# Patient Record
Sex: FEMALE | Race: WHITE | Employment: UNEMPLOYED | URBAN - METROPOLITAN AREA
[De-identification: names, ages, dates, MRNs, and addresses within clinical notes are randomized per-mention and may not be internally consistent; named-entity substitution may affect disease eponyms.]

---

## 2018-11-14 ENCOUNTER — HOSPITAL ENCOUNTER (OUTPATIENT)
Age: 23
Setting detail: OBSERVATION
Discharge: HOME | End: 2018-11-14
Attending: OBSTETRICS & GYNECOLOGY | Admitting: OBSTETRICS & GYNECOLOGY
Payer: COMMERCIAL

## 2018-11-14 VITALS
RESPIRATION RATE: 16 BRPM | SYSTOLIC BLOOD PRESSURE: 118 MMHG | HEART RATE: 96 BPM | DIASTOLIC BLOOD PRESSURE: 80 MMHG | TEMPERATURE: 98 F | HEIGHT: 62 IN | WEIGHT: 193 LBS | BODY MASS INDEX: 35.51 KG/M2

## 2018-11-14 PROBLEM — O36.8390 FETAL ARRHYTHMIA AFFECTING PREGNANCY, ANTEPARTUM: Status: ACTIVE | Noted: 2018-11-14

## 2018-11-14 PROBLEM — O36.8190 DECREASED FETAL MOVEMENT AFFECTING MANAGEMENT OF MOTHER, ANTEPARTUM: Status: ACTIVE | Noted: 2018-11-14

## 2018-11-14 PROBLEM — R94.8 ABNORMAL PLACENTA FUNCTION TEST: Status: ACTIVE | Noted: 2018-11-14

## 2018-11-14 LAB
AMPHETAMINE SCREEN, URINE: NOT DETECTED
BARBITURATE SCREEN URINE: NOT DETECTED
BENZODIAZEPINE SCREEN, URINE: NOT DETECTED
CANNABINOID SCREEN URINE: NOT DETECTED
COCAINE METABOLITE SCREEN URINE: NOT DETECTED
METHADONE SCREEN, URINE: NOT DETECTED
OPIATE SCREEN URINE: NOT DETECTED
PHENCYCLIDINE SCREEN URINE: NOT DETECTED
PROPOXYPHENE SCREEN: NOT DETECTED

## 2018-11-14 PROCEDURE — 76820 UMBILICAL ARTERY ECHO: CPT

## 2018-11-14 PROCEDURE — 76805 OB US >/= 14 WKS SNGL FETUS: CPT

## 2018-11-14 PROCEDURE — 99243 OFF/OP CNSLTJ NEW/EST LOW 30: CPT | Performed by: OBSTETRICS & GYNECOLOGY

## 2018-11-14 PROCEDURE — 76819 FETAL BIOPHYS PROFIL W/O NST: CPT | Performed by: OBSTETRICS & GYNECOLOGY

## 2018-11-14 PROCEDURE — 80307 DRUG TEST PRSMV CHEM ANLYZR: CPT

## 2018-11-14 PROCEDURE — 76805 OB US >/= 14 WKS SNGL FETUS: CPT | Performed by: OBSTETRICS & GYNECOLOGY

## 2018-11-14 PROCEDURE — G0378 HOSPITAL OBSERVATION PER HR: HCPCS

## 2018-11-14 PROCEDURE — 59025 FETAL NON-STRESS TEST: CPT

## 2018-11-14 PROCEDURE — 76820 UMBILICAL ARTERY ECHO: CPT | Performed by: OBSTETRICS & GYNECOLOGY

## 2018-11-14 PROCEDURE — 76819 FETAL BIOPHYS PROFIL W/O NST: CPT

## 2018-11-14 NOTE — PROGRESS NOTES
Comes from home. Was seen earlier today in office where she complained of decreased fetal movement. [facility] first noted decreased movement at 2000 yesterday.  [facility] told to go home and eat and do kick counts and then call doctor back. Still had decreased movements and came for monitoring. Arrhythmia heard in fetal heart rate. Denies complications in prekgnancy.   Is rh negative and had rhogam.

## 2018-11-15 NOTE — CONSULTS
rate is 147 bpm. The fetus is in the cephalic presentation which was confirmed by the ultrasound assessment. EXTREMITIES:  No peripheral edema is noted. A fetal ultrasound assessment was performed today. A report is enclosed for your review. Admission on 2018, Discharged on 2018   Component Date Value Ref Range Status    Amphetamine Screen, Urine 2018 NOT DETECTED  Negative <1000 ng/mL Final    Barbiturate Screen, Ur 2018 NOT DETECTED  Negative < 200 ng/mL Final    Benzodiazepine Screen, Urine 2018 NOT DETECTED  Negative < 200 ng/mL Final    Cannabinoid Scrn, Ur 2018 NOT DETECTED  Negative < 50ng/mL Final    Cocaine Metabolite Screen, Urine 2018 NOT DETECTED  Negative < 300 ng/mL Final    Opiate Scrn, Ur 2018 NOT DETECTED  Negative < 300ng/mL Final    PCP Screen, Urine 2018 NOT DETECTED  Negative < 25 ng/mL Final    Methadone Screen, Urine 2018 NOT DETECTED  Negative <300 ng/mL Final    Propoxyphene Scrn, Ur 2018 NOT DETECTED  Negative <300 ng/mL Final       IMPRESSION:  1.  IUP at 32 weeks 0 days gestation based on her Estimated Date of Delivery: 19  2. Fetal arrhythmia  3. Fetal PACs  4. Decreased fetal movement on admission, but within normal limits now  5. Reassuring fetal heart rate tracing  6.  Reassuring biophysical profile and cord Doppler testing    PLAN:  I would recommend that the patient count fetal movements and call if she notices any subjective decrease in fetal movements, particularly if there are less than 10 major movements in an hour. Non-stress testing should be performed every 3 to 4 days through the balance of the pregnancy. Serial ultrasounds to assess fetal anatomy and growth should be performed. The patient is at increase risk for  morbidity and mortality secondary to her history.  Weekly BPP and cord Doppler testing should be performed, unless there is a clinical indication to perform the

## 2018-11-15 NOTE — PROGRESS NOTES
Spoke with Dr Mariel Sinclair. Notified that [facility] states patient can be discharged with NST rtwice weekly and BPP weekly. Orders given for discharge.  Patient to be seen in office on Monday and NST on unit on friday

## 2018-11-15 NOTE — PROGRESS NOTES
Discharge instructions given to patient. Patient states that she verbally understands instructions. Patient educated on  labor signs and when to return to unit. Patient has no questions at this time.  Patient ambulated off floor alone

## 2018-11-19 ENCOUNTER — HOSPITAL ENCOUNTER (OUTPATIENT)
Age: 23
Discharge: HOME | End: 2018-11-19
Attending: OBSTETRICS & GYNECOLOGY | Admitting: OBSTETRICS & GYNECOLOGY
Payer: COMMERCIAL

## 2018-11-19 VITALS
RESPIRATION RATE: 16 BRPM | HEART RATE: 83 BPM | HEIGHT: 62 IN | WEIGHT: 195 LBS | DIASTOLIC BLOOD PRESSURE: 56 MMHG | TEMPERATURE: 99 F | SYSTOLIC BLOOD PRESSURE: 93 MMHG | BODY MASS INDEX: 35.88 KG/M2

## 2018-11-19 PROBLEM — Z3A.32 32 WEEKS GESTATION OF PREGNANCY: Status: ACTIVE | Noted: 2018-11-19

## 2018-11-19 LAB
HCT VFR BLD CALC: 39.6 % (ref 34–48)
HEMOGLOBIN: 13.2 G/DL (ref 11.5–15.5)
KLEIHAUER BETKE: NORMAL
MCH RBC QN AUTO: 32 PG (ref 26–35)
MCHC RBC AUTO-ENTMCNC: 33.3 % (ref 32–34.5)
MCV RBC AUTO: 95.9 FL (ref 80–99.9)
PDW BLD-RTO: 13.6 FL (ref 11.5–15)
PLATELET # BLD: 221 E9/L (ref 130–450)
PMV BLD AUTO: 9 FL (ref 7–12)
RBC # BLD: 4.13 E12/L (ref 3.5–5.5)
WBC # BLD: 12.3 E9/L (ref 4.5–11.5)

## 2018-11-19 PROCEDURE — 85027 COMPLETE CBC AUTOMATED: CPT

## 2018-11-19 PROCEDURE — 85460 HEMOGLOBIN FETAL: CPT

## 2018-11-19 PROCEDURE — 36415 COLL VENOUS BLD VENIPUNCTURE: CPT

## 2018-11-19 PROCEDURE — G0379 DIRECT REFER HOSPITAL OBSERV: HCPCS

## 2018-11-19 PROCEDURE — G0378 HOSPITAL OBSERVATION PER HR: HCPCS

## 2018-11-19 PROCEDURE — 99213 OFFICE O/P EST LOW 20 MIN: CPT | Performed by: OBSTETRICS & GYNECOLOGY

## 2018-11-23 ENCOUNTER — HOSPITAL ENCOUNTER (OUTPATIENT)
Age: 23
Discharge: HOME | End: 2018-11-23
Attending: OBSTETRICS & GYNECOLOGY | Admitting: OBSTETRICS & GYNECOLOGY
Payer: COMMERCIAL

## 2018-11-23 ENCOUNTER — APPOINTMENT (OUTPATIENT)
Dept: LABOR AND DELIVERY | Age: 23
End: 2018-11-23
Payer: COMMERCIAL

## 2018-11-23 VITALS
WEIGHT: 195 LBS | TEMPERATURE: 97.8 F | SYSTOLIC BLOOD PRESSURE: 112 MMHG | BODY MASS INDEX: 35.88 KG/M2 | HEIGHT: 62 IN | HEART RATE: 77 BPM | RESPIRATION RATE: 16 BRPM | DIASTOLIC BLOOD PRESSURE: 74 MMHG

## 2018-11-23 PROBLEM — Z3A.33 33 WEEKS GESTATION OF PREGNANCY: Status: ACTIVE | Noted: 2018-11-23

## 2018-11-23 PROCEDURE — 59025 FETAL NON-STRESS TEST: CPT
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
## 2019-01-02 ENCOUNTER — ANESTHESIA EVENT (OUTPATIENT)
Dept: LABOR AND DELIVERY | Age: 24
DRG: 560 | End: 2019-01-02
Payer: COMMERCIAL

## 2019-01-02 ENCOUNTER — ANESTHESIA (OUTPATIENT)
Dept: LABOR AND DELIVERY | Age: 24
DRG: 560 | End: 2019-01-02
Payer: COMMERCIAL

## 2019-01-02 ENCOUNTER — HOSPITAL ENCOUNTER (INPATIENT)
Age: 24
LOS: 3 days | Discharge: HOME | DRG: 560 | End: 2019-01-05
Attending: OBSTETRICS & GYNECOLOGY | Admitting: OBSTETRICS & GYNECOLOGY
Payer: COMMERCIAL

## 2019-01-02 PROBLEM — O36.5990 IUGR, ANTENATAL: Status: ACTIVE | Noted: 2019-01-02

## 2019-01-02 LAB
HCT VFR BLD CALC: 36.7 % (ref 34–48)
HEMOGLOBIN: 12.9 G/DL (ref 11.5–15.5)
MCH RBC QN AUTO: 33.2 PG (ref 26–35)
MCHC RBC AUTO-ENTMCNC: 35.1 % (ref 32–34.5)
MCV RBC AUTO: 94.3 FL (ref 80–99.9)
PDW BLD-RTO: 13.2 FL (ref 11.5–15)
PLATELET # BLD: 244 E9/L (ref 130–450)
PMV BLD AUTO: 9.3 FL (ref 7–12)
RBC # BLD: 3.89 E12/L (ref 3.5–5.5)
WBC # BLD: 12.4 E9/L (ref 4.5–11.5)

## 2019-01-02 PROCEDURE — 86880 COOMBS TEST DIRECT: CPT

## 2019-01-02 PROCEDURE — 2580000003 HC RX 258: Performed by: OBSTETRICS & GYNECOLOGY

## 2019-01-02 PROCEDURE — 86850 RBC ANTIBODY SCREEN: CPT

## 2019-01-02 PROCEDURE — 86870 RBC ANTIBODY IDENTIFICATION: CPT

## 2019-01-02 PROCEDURE — 6360000002 HC RX W HCPCS: Performed by: OBSTETRICS & GYNECOLOGY

## 2019-01-02 PROCEDURE — 86901 BLOOD TYPING SEROLOGIC RH(D): CPT

## 2019-01-02 PROCEDURE — 86900 BLOOD TYPING SEROLOGIC ABO: CPT

## 2019-01-02 PROCEDURE — 85027 COMPLETE CBC AUTOMATED: CPT

## 2019-01-02 PROCEDURE — 1220000001 HC SEMI PRIVATE L&D R&B

## 2019-01-02 PROCEDURE — 36415 COLL VENOUS BLD VENIPUNCTURE: CPT

## 2019-01-02 PROCEDURE — 3E033VJ INTRODUCTION OF OTHER HORMONE INTO PERIPHERAL VEIN, PERCUTANEOUS APPROACH: ICD-10-PCS | Performed by: OBSTETRICS & GYNECOLOGY

## 2019-01-02 RX ORDER — DIPHENHYDRAMINE HCL 25 MG
25 TABLET ORAL EVERY 4 HOURS PRN
Status: DISCONTINUED | OUTPATIENT
Start: 2019-01-02 | End: 2019-01-03 | Stop reason: HOSPADM

## 2019-01-02 RX ORDER — ACETAMINOPHEN 325 MG/1
650 TABLET ORAL EVERY 4 HOURS PRN
Status: DISCONTINUED | OUTPATIENT
Start: 2019-01-02 | End: 2019-01-03 | Stop reason: HOSPADM

## 2019-01-02 RX ORDER — SODIUM CHLORIDE 0.9 % (FLUSH) 0.9 %
10 SYRINGE (ML) INJECTION EVERY 12 HOURS SCHEDULED
Status: DISCONTINUED | OUTPATIENT
Start: 2019-01-02 | End: 2019-01-03 | Stop reason: HOSPADM

## 2019-01-02 RX ORDER — ONDANSETRON 2 MG/ML
4 INJECTION INTRAMUSCULAR; INTRAVENOUS EVERY 6 HOURS PRN
Status: DISCONTINUED | OUTPATIENT
Start: 2019-01-02 | End: 2019-01-03 | Stop reason: HOSPADM

## 2019-01-02 RX ORDER — SODIUM CHLORIDE 0.9 % (FLUSH) 0.9 %
10 SYRINGE (ML) INJECTION PRN
Status: DISCONTINUED | OUTPATIENT
Start: 2019-01-02 | End: 2019-01-03 | Stop reason: HOSPADM

## 2019-01-02 RX ORDER — SODIUM CHLORIDE, SODIUM LACTATE, POTASSIUM CHLORIDE, CALCIUM CHLORIDE 600; 310; 30; 20 MG/100ML; MG/100ML; MG/100ML; MG/100ML
INJECTION, SOLUTION INTRAVENOUS CONTINUOUS
Status: DISCONTINUED | OUTPATIENT
Start: 2019-01-02 | End: 2019-01-03 | Stop reason: SDUPTHER

## 2019-01-02 RX ORDER — DOCUSATE SODIUM 100 MG/1
100 CAPSULE, LIQUID FILLED ORAL 2 TIMES DAILY
Status: DISCONTINUED | OUTPATIENT
Start: 2019-01-02 | End: 2019-01-03 | Stop reason: HOSPADM

## 2019-01-02 RX ADMIN — Medication 1 MILLI-UNITS/MIN: at 21:52

## 2019-01-02 RX ADMIN — SODIUM CHLORIDE, POTASSIUM CHLORIDE, SODIUM LACTATE AND CALCIUM CHLORIDE: 600; 310; 30; 20 INJECTION, SOLUTION INTRAVENOUS at 21:40

## 2019-01-03 LAB
ABO/RH: NORMAL
AMPHETAMINE SCREEN, URINE: NOT DETECTED
ANTIBODY IDENTIFICATION: NORMAL
ANTIBODY SCREEN: NORMAL
BARBITURATE SCREEN URINE: NOT DETECTED
BENZODIAZEPINE SCREEN, URINE: NOT DETECTED
CANNABINOID SCREEN URINE: NOT DETECTED
COCAINE METABOLITE SCREEN URINE: NOT DETECTED
DAT POLYSPECIFIC: NORMAL
METHADONE SCREEN, URINE: NOT DETECTED
OPIATE SCREEN URINE: NOT DETECTED
PHENCYCLIDINE SCREEN URINE: NOT DETECTED
PROPOXYPHENE SCREEN: NOT DETECTED

## 2019-01-03 PROCEDURE — 6370000000 HC RX 637 (ALT 250 FOR IP): Performed by: OBSTETRICS & GYNECOLOGY

## 2019-01-03 PROCEDURE — 1220000000 HC SEMI PRIVATE OB R&B

## 2019-01-03 PROCEDURE — 3700000025 ANESTHESIA EPIDURAL BLOCK: Performed by: ANESTHESIOLOGY

## 2019-01-03 PROCEDURE — 80307 DRUG TEST PRSMV CHEM ANLYZR: CPT

## 2019-01-03 PROCEDURE — 51701 INSERT BLADDER CATHETER: CPT

## 2019-01-03 PROCEDURE — 2580000003 HC RX 258: Performed by: OBSTETRICS & GYNECOLOGY

## 2019-01-03 PROCEDURE — 7200000001 HC VAGINAL DELIVERY

## 2019-01-03 PROCEDURE — 88307 TISSUE EXAM BY PATHOLOGIST: CPT

## 2019-01-03 PROCEDURE — 2500000003 HC RX 250 WO HCPCS: Performed by: ANESTHESIOLOGY

## 2019-01-03 RX ORDER — DOCUSATE SODIUM 100 MG/1
100 CAPSULE, LIQUID FILLED ORAL 2 TIMES DAILY
Status: DISCONTINUED | OUTPATIENT
Start: 2019-01-03 | End: 2019-01-05 | Stop reason: HOSPADM

## 2019-01-03 RX ORDER — LANOLIN 100 %
OINTMENT (GRAM) TOPICAL PRN
Status: DISCONTINUED | OUTPATIENT
Start: 2019-01-03 | End: 2019-01-05 | Stop reason: HOSPADM

## 2019-01-03 RX ORDER — IBUPROFEN 800 MG/1
800 TABLET ORAL EVERY 8 HOURS PRN
Status: DISCONTINUED | OUTPATIENT
Start: 2019-01-03 | End: 2019-01-05 | Stop reason: HOSPADM

## 2019-01-03 RX ORDER — SODIUM CHLORIDE 0.9 % (FLUSH) 0.9 %
10 SYRINGE (ML) INJECTION EVERY 12 HOURS SCHEDULED
Status: DISCONTINUED | OUTPATIENT
Start: 2019-01-03 | End: 2019-01-05 | Stop reason: HOSPADM

## 2019-01-03 RX ORDER — ONDANSETRON 2 MG/ML
4 INJECTION INTRAMUSCULAR; INTRAVENOUS EVERY 6 HOURS PRN
Status: DISCONTINUED | OUTPATIENT
Start: 2019-01-03 | End: 2019-01-03 | Stop reason: HOSPADM

## 2019-01-03 RX ORDER — ACETAMINOPHEN 650 MG
TABLET, EXTENDED RELEASE ORAL
Status: DISPENSED
Start: 2019-01-03 | End: 2019-01-03

## 2019-01-03 RX ORDER — FERROUS SULFATE 325(65) MG
325 TABLET ORAL 2 TIMES DAILY WITH MEALS
Status: DISCONTINUED | OUTPATIENT
Start: 2019-01-03 | End: 2019-01-05 | Stop reason: HOSPADM

## 2019-01-03 RX ORDER — ACETAMINOPHEN 325 MG/1
650 TABLET ORAL EVERY 4 HOURS PRN
Status: DISCONTINUED | OUTPATIENT
Start: 2019-01-03 | End: 2019-01-05 | Stop reason: HOSPADM

## 2019-01-03 RX ORDER — SODIUM CHLORIDE 0.9 % (FLUSH) 0.9 %
10 SYRINGE (ML) INJECTION PRN
Status: DISCONTINUED | OUTPATIENT
Start: 2019-01-03 | End: 2019-01-05 | Stop reason: HOSPADM

## 2019-01-03 RX ORDER — NALOXONE HYDROCHLORIDE 0.4 MG/ML
0.4 INJECTION, SOLUTION INTRAMUSCULAR; INTRAVENOUS; SUBCUTANEOUS PRN
Status: DISCONTINUED | OUTPATIENT
Start: 2019-01-03 | End: 2019-01-03 | Stop reason: HOSPADM

## 2019-01-03 RX ORDER — LIDOCAINE HYDROCHLORIDE 10 MG/ML
INJECTION, SOLUTION INFILTRATION; PERINEURAL
Status: DISCONTINUED
Start: 2019-01-03 | End: 2019-01-03 | Stop reason: WASHOUT

## 2019-01-03 RX ORDER — SODIUM CHLORIDE, SODIUM LACTATE, POTASSIUM CHLORIDE, CALCIUM CHLORIDE 600; 310; 30; 20 MG/100ML; MG/100ML; MG/100ML; MG/100ML
INJECTION, SOLUTION INTRAVENOUS CONTINUOUS
Status: DISCONTINUED | OUTPATIENT
Start: 2019-01-03 | End: 2019-01-05 | Stop reason: HOSPADM

## 2019-01-03 RX ORDER — NALBUPHINE HCL 10 MG/ML
5 AMPUL (ML) INJECTION EVERY 4 HOURS PRN
Status: DISCONTINUED | OUTPATIENT
Start: 2019-01-03 | End: 2019-01-03 | Stop reason: HOSPADM

## 2019-01-03 RX ADMIN — Medication 15 ML/HR: at 04:36

## 2019-01-03 RX ADMIN — IBUPROFEN 800 MG: 800 TABLET, FILM COATED ORAL at 23:47

## 2019-01-03 RX ADMIN — Medication 7 ML: at 04:29

## 2019-01-03 RX ADMIN — Medication 10 ML: at 22:43

## 2019-01-03 RX ADMIN — SODIUM CHLORIDE, POTASSIUM CHLORIDE, SODIUM LACTATE AND CALCIUM CHLORIDE: 600; 310; 30; 20 INJECTION, SOLUTION INTRAVENOUS at 05:00

## 2019-01-03 RX ADMIN — SODIUM CHLORIDE, POTASSIUM CHLORIDE, SODIUM LACTATE AND CALCIUM CHLORIDE: 600; 310; 30; 20 INJECTION, SOLUTION INTRAVENOUS at 03:34

## 2019-01-03 ASSESSMENT — PAIN SCALES - GENERAL
PAINLEVEL_OUTOF10: 0
PAINLEVEL_OUTOF10: 4

## 2019-01-04 LAB
FETAL SCREEN: NORMAL
HCT VFR BLD CALC: 38.5 % (ref 34–48)
HEMOGLOBIN: 13.4 G/DL (ref 11.5–15.5)
RHIG LOT NUMBER: NORMAL

## 2019-01-04 PROCEDURE — 6360000002 HC RX W HCPCS: Performed by: OBSTETRICS & GYNECOLOGY

## 2019-01-04 PROCEDURE — 1220000000 HC SEMI PRIVATE OB R&B

## 2019-01-04 PROCEDURE — 36415 COLL VENOUS BLD VENIPUNCTURE: CPT

## 2019-01-04 PROCEDURE — 85014 HEMATOCRIT: CPT

## 2019-01-04 PROCEDURE — 85018 HEMOGLOBIN: CPT

## 2019-01-04 PROCEDURE — 6370000000 HC RX 637 (ALT 250 FOR IP): Performed by: OBSTETRICS & GYNECOLOGY

## 2019-01-04 PROCEDURE — 85461 HEMOGLOBIN FETAL: CPT

## 2019-01-04 RX ADMIN — DOCUSATE SODIUM 100 MG: 100 CAPSULE, LIQUID FILLED ORAL at 21:06

## 2019-01-04 RX ADMIN — DOCUSATE SODIUM 100 MG: 100 CAPSULE, LIQUID FILLED ORAL at 12:13

## 2019-01-04 RX ADMIN — HUMAN RHO(D) IMMUNE GLOBULIN 300 MCG: 300 INJECTION, SOLUTION INTRAMUSCULAR at 17:17

## 2019-01-04 RX ADMIN — Medication: at 12:13

## 2019-01-04 RX ADMIN — IBUPROFEN 800 MG: 800 TABLET, FILM COATED ORAL at 21:06

## 2019-01-04 RX ADMIN — IBUPROFEN 800 MG: 800 TABLET, FILM COATED ORAL at 12:13

## 2019-01-04 ASSESSMENT — PAIN DESCRIPTION - PROGRESSION
CLINICAL_PROGRESSION: GRADUALLY WORSENING
CLINICAL_PROGRESSION: RESOLVED

## 2019-01-04 ASSESSMENT — PAIN DESCRIPTION - RADICULAR PAIN
RADICULAR_PAIN: ABSENT
RADICULAR_PAIN: ABSENT

## 2019-01-04 ASSESSMENT — PAIN SCALES - GENERAL
PAINLEVEL_OUTOF10: 3
PAINLEVEL_OUTOF10: 0
PAINLEVEL_OUTOF10: 2
PAINLEVEL_OUTOF10: 0

## 2019-01-04 ASSESSMENT — PAIN DESCRIPTION - DESCRIPTORS: DESCRIPTORS: CRAMPING

## 2019-01-04 ASSESSMENT — PAIN DESCRIPTION - PAIN TYPE: TYPE: ACUTE PAIN

## 2019-01-04 ASSESSMENT — PAIN DESCRIPTION - LOCATION: LOCATION: ABDOMEN

## 2019-01-04 ASSESSMENT — PAIN DESCRIPTION - FREQUENCY: FREQUENCY: INTERMITTENT

## 2019-01-05 VITALS
OXYGEN SATURATION: 96 % | HEART RATE: 78 BPM | RESPIRATION RATE: 16 BRPM | TEMPERATURE: 98.1 F | WEIGHT: 209 LBS | SYSTOLIC BLOOD PRESSURE: 127 MMHG | DIASTOLIC BLOOD PRESSURE: 82 MMHG | HEIGHT: 62 IN | BODY MASS INDEX: 38.46 KG/M2

## 2019-01-05 PROCEDURE — 6370000000 HC RX 637 (ALT 250 FOR IP): Performed by: OBSTETRICS & GYNECOLOGY

## 2019-01-05 RX ORDER — IBUPROFEN 800 MG/1
800 TABLET ORAL EVERY 8 HOURS PRN
Qty: 24 TABLET | Refills: 0 | Status: SHIPPED | OUTPATIENT
Start: 2019-01-05 | End: 2021-12-01

## 2019-01-05 RX ADMIN — Medication: at 09:03

## 2019-01-05 RX ADMIN — IBUPROFEN 800 MG: 800 TABLET, FILM COATED ORAL at 09:03

## 2019-01-05 RX ADMIN — DOCUSATE SODIUM 100 MG: 100 CAPSULE, LIQUID FILLED ORAL at 09:03

## 2019-01-05 ASSESSMENT — PAIN DESCRIPTION - PROGRESSION: CLINICAL_PROGRESSION: RESOLVED

## 2019-01-05 ASSESSMENT — PAIN SCALES - GENERAL
PAINLEVEL_OUTOF10: 0
PAINLEVEL_OUTOF10: 2

## 2019-01-05 ASSESSMENT — PAIN DESCRIPTION - RADICULAR PAIN: RADICULAR_PAIN: ABSENT

## 2022-03-28 ENCOUNTER — HOSPITAL ENCOUNTER (OUTPATIENT)
Dept: INFUSION THERAPY | Age: 27
Setting detail: INFUSION SERIES
Discharge: HOME | End: 2022-03-28
Payer: MEDICAID

## 2022-03-28 VITALS
OXYGEN SATURATION: 99 % | SYSTOLIC BLOOD PRESSURE: 121 MMHG | WEIGHT: 215 LBS | TEMPERATURE: 97.3 F | DIASTOLIC BLOOD PRESSURE: 92 MMHG | HEART RATE: 101 BPM | RESPIRATION RATE: 16 BRPM | HEIGHT: 64 IN | BODY MASS INDEX: 36.7 KG/M2

## 2022-03-28 PROCEDURE — 96372 THER/PROPH/DIAG INJ SC/IM: CPT

## 2022-03-28 PROCEDURE — 6360000002 HC RX W HCPCS: Performed by: OBSTETRICS & GYNECOLOGY

## 2022-03-28 RX ADMIN — HUMAN RHO(D) IMMUNE GLOBULIN 300 MCG: 300 INJECTION, SOLUTION INTRAMUSCULAR at 08:40

## 2022-03-28 NOTE — PROGRESS NOTES
Tolerated injection well. Verified patient Rh Negative, verified Informed consent for RhoGAM injection and consent for treatment both signed, verified patient name, , MRN, and RhoGAM LOT# and EXP date. Patient was given RhoGAM medication information insert that came from blood bank with the injection. Highlighted reportable signs/symptoms and patient verbalizes understanding and denies any questions, offered education material and/or discharge material, reviewed medication information and signs and symptoms  and educated on possible side effects, verbalizes good knowledge of current plan patient verbalizes understanding, and has no signs or symptoms to report at this time. Patient discharged. Patient alert and oriented x3. No distress noted. Vital signs stable. Patient denies any new or worsening pain. Patient denies any needs. All questions answered. Patient declined to wait observation period of 20 minutes after the injection instructed on importance of staying  and patient declines . No reaction noted. Patient given the filled out RhoGAM identification card and instructed on keeping with her,she verbalizes understanding.

## 2022-06-10 PROBLEM — O36.5990 IUGR, ANTENATAL: Status: RESOLVED | Noted: 2019-01-02 | Resolved: 2022-06-10

## 2022-06-10 PROBLEM — O09.291: Status: ACTIVE | Noted: 2022-06-10

## 2022-06-10 PROBLEM — O40.3XX0 POLYHYDRAMNIOS IN THIRD TRIMESTER: Status: ACTIVE | Noted: 2022-06-10

## 2022-06-10 PROBLEM — Z86.32: Status: ACTIVE | Noted: 2022-06-10

## 2022-06-10 PROBLEM — Z3A.32 32 WEEKS GESTATION OF PREGNANCY: Status: RESOLVED | Noted: 2018-11-19 | Resolved: 2022-06-10

## 2022-06-10 PROBLEM — Z3A.33 33 WEEKS GESTATION OF PREGNANCY: Status: RESOLVED | Noted: 2018-11-23 | Resolved: 2022-06-10

## 2022-06-10 PROBLEM — Z86.79 HISTORY OF WOLFF-PARKINSON-WHITE (WPW) SYNDROME: Status: ACTIVE | Noted: 2022-06-10

## 2022-06-10 PROBLEM — O09.43 HIGH RISK MULTIGRAVIDA, THIRD TRIMESTER: Status: ACTIVE | Noted: 2022-06-10

## 2022-06-10 PROBLEM — Z67.91 RH NEGATIVE STATE IN ANTEPARTUM PERIOD: Status: ACTIVE | Noted: 2022-06-10

## 2022-06-10 PROBLEM — R94.8 ABNORMAL PLACENTA FUNCTION TEST: Status: RESOLVED | Noted: 2018-11-14 | Resolved: 2022-06-10

## 2022-06-10 PROBLEM — O36.8390 FETAL ARRHYTHMIA AFFECTING PREGNANCY, ANTEPARTUM: Status: RESOLVED | Noted: 2018-11-14 | Resolved: 2022-06-10

## 2022-06-10 PROBLEM — O26.899 RH NEGATIVE STATE IN ANTEPARTUM PERIOD: Status: ACTIVE | Noted: 2022-06-10

## 2022-06-14 ENCOUNTER — TELEPHONE (OUTPATIENT)
Dept: LABOR AND DELIVERY | Age: 27
End: 2022-06-14

## 2022-06-15 ENCOUNTER — APPOINTMENT (OUTPATIENT)
Dept: LABOR AND DELIVERY | Age: 27
DRG: 560 | End: 2022-06-15
Payer: MEDICAID

## 2022-06-15 ENCOUNTER — HOSPITAL ENCOUNTER (INPATIENT)
Age: 27
LOS: 2 days | Discharge: HOME | DRG: 560 | End: 2022-06-17
Attending: OBSTETRICS & GYNECOLOGY | Admitting: OBSTETRICS & GYNECOLOGY
Payer: MEDICAID

## 2022-06-15 PROBLEM — Z34.90 NORMAL PREGNANCY, UNSPECIFIED TRIMESTER: Status: ACTIVE | Noted: 2022-06-15

## 2022-06-15 LAB
ABO/RH: NORMAL
AMPHETAMINE SCREEN, URINE: NOT DETECTED
ANTIBODY SCREEN: NORMAL
BARBITURATE SCREEN URINE: NOT DETECTED
BENZODIAZEPINE SCREEN, URINE: NOT DETECTED
CANNABINOID SCREEN URINE: NOT DETECTED
COCAINE METABOLITE SCREEN URINE: NOT DETECTED
FENTANYL SCREEN, URINE: NOT DETECTED
HCT VFR BLD CALC: 37 % (ref 34–48)
HEMOGLOBIN: 12.3 G/DL (ref 11.5–15.5)
Lab: NORMAL
MCH RBC QN AUTO: 30.8 PG (ref 26–35)
MCHC RBC AUTO-ENTMCNC: 33.2 % (ref 32–34.5)
MCV RBC AUTO: 92.5 FL (ref 80–99.9)
METHADONE SCREEN, URINE: NOT DETECTED
OPIATE SCREEN URINE: NOT DETECTED
OXYCODONE URINE: NOT DETECTED
PDW BLD-RTO: 14.6 FL (ref 11.5–15)
PHENCYCLIDINE SCREEN URINE: NOT DETECTED
PLATELET # BLD: 284 E9/L (ref 130–450)
PMV BLD AUTO: 9.2 FL (ref 7–12)
RBC # BLD: 4 E12/L (ref 3.5–5.5)
WBC # BLD: 11 E9/L (ref 4.5–11.5)

## 2022-06-15 PROCEDURE — 86850 RBC ANTIBODY SCREEN: CPT

## 2022-06-15 PROCEDURE — 6360000002 HC RX W HCPCS: Performed by: OBSTETRICS & GYNECOLOGY

## 2022-06-15 PROCEDURE — 36415 COLL VENOUS BLD VENIPUNCTURE: CPT

## 2022-06-15 PROCEDURE — 85027 COMPLETE CBC AUTOMATED: CPT

## 2022-06-15 PROCEDURE — 99222 1ST HOSP IP/OBS MODERATE 55: CPT | Performed by: NURSE PRACTITIONER

## 2022-06-15 PROCEDURE — 2580000003 HC RX 258: Performed by: OBSTETRICS & GYNECOLOGY

## 2022-06-15 PROCEDURE — 86900 BLOOD TYPING SEROLOGIC ABO: CPT

## 2022-06-15 PROCEDURE — 1220000001 HC SEMI PRIVATE L&D R&B

## 2022-06-15 PROCEDURE — 80307 DRUG TEST PRSMV CHEM ANLYZR: CPT

## 2022-06-15 PROCEDURE — 86901 BLOOD TYPING SEROLOGIC RH(D): CPT

## 2022-06-15 RX ORDER — SODIUM CHLORIDE 9 MG/ML
25 INJECTION, SOLUTION INTRAVENOUS PRN
Status: DISCONTINUED | OUTPATIENT
Start: 2022-06-15 | End: 2022-06-16

## 2022-06-15 RX ORDER — SODIUM CHLORIDE 0.9 % (FLUSH) 0.9 %
5-40 SYRINGE (ML) INJECTION EVERY 12 HOURS SCHEDULED
Status: DISCONTINUED | OUTPATIENT
Start: 2022-06-15 | End: 2022-06-16

## 2022-06-15 RX ORDER — SODIUM CHLORIDE 0.9 % (FLUSH) 0.9 %
5-40 SYRINGE (ML) INJECTION PRN
Status: DISCONTINUED | OUTPATIENT
Start: 2022-06-15 | End: 2022-06-16

## 2022-06-15 RX ORDER — ACETAMINOPHEN 650 MG
TABLET, EXTENDED RELEASE ORAL
Status: DISCONTINUED
Start: 2022-06-15 | End: 2022-06-16

## 2022-06-15 RX ORDER — LIDOCAINE HYDROCHLORIDE 10 MG/ML
INJECTION, SOLUTION INFILTRATION; PERINEURAL
Status: DISCONTINUED
Start: 2022-06-15 | End: 2022-06-16

## 2022-06-15 RX ORDER — SODIUM CHLORIDE, SODIUM LACTATE, POTASSIUM CHLORIDE, AND CALCIUM CHLORIDE .6; .31; .03; .02 G/100ML; G/100ML; G/100ML; G/100ML
1000 INJECTION, SOLUTION INTRAVENOUS PRN
Status: DISCONTINUED | OUTPATIENT
Start: 2022-06-15 | End: 2022-06-16

## 2022-06-15 RX ORDER — ONDANSETRON 2 MG/ML
4 INJECTION INTRAMUSCULAR; INTRAVENOUS EVERY 6 HOURS PRN
Status: DISCONTINUED | OUTPATIENT
Start: 2022-06-15 | End: 2022-06-16

## 2022-06-15 RX ORDER — SODIUM CHLORIDE, SODIUM LACTATE, POTASSIUM CHLORIDE, AND CALCIUM CHLORIDE .6; .31; .03; .02 G/100ML; G/100ML; G/100ML; G/100ML
500 INJECTION, SOLUTION INTRAVENOUS PRN
Status: DISCONTINUED | OUTPATIENT
Start: 2022-06-15 | End: 2022-06-16

## 2022-06-15 RX ORDER — SODIUM CHLORIDE, SODIUM LACTATE, POTASSIUM CHLORIDE, CALCIUM CHLORIDE 600; 310; 30; 20 MG/100ML; MG/100ML; MG/100ML; MG/100ML
INJECTION, SOLUTION INTRAVENOUS CONTINUOUS
Status: DISCONTINUED | OUTPATIENT
Start: 2022-06-15 | End: 2022-06-16

## 2022-06-15 RX ADMIN — Medication 1 MILLI-UNITS/MIN: at 10:30

## 2022-06-15 RX ADMIN — SODIUM CHLORIDE, POTASSIUM CHLORIDE, SODIUM LACTATE AND CALCIUM CHLORIDE: 600; 310; 30; 20 INJECTION, SOLUTION INTRAVENOUS at 08:10

## 2022-06-15 RX ADMIN — BUTORPHANOL TARTRATE 2 MG: 2 INJECTION, SOLUTION INTRAMUSCULAR; INTRAVENOUS at 22:32

## 2022-06-15 ASSESSMENT — PAIN DESCRIPTION - ORIENTATION: ORIENTATION: LOWER

## 2022-06-15 ASSESSMENT — PAIN DESCRIPTION - LOCATION: LOCATION: ABDOMEN

## 2022-06-15 ASSESSMENT — PAIN DESCRIPTION - DESCRIPTORS: DESCRIPTORS: CRAMPING;DISCOMFORT

## 2022-06-15 ASSESSMENT — PAIN SCALES - GENERAL: PAINLEVEL_OUTOF10: 7

## 2022-06-15 ASSESSMENT — PAIN - FUNCTIONAL ASSESSMENT: PAIN_FUNCTIONAL_ASSESSMENT: ACTIVITIES ARE NOT PREVENTED

## 2022-06-15 NOTE — PROGRESS NOTES
Dr Coleen Castaneda called at the office, informed RASHID Gonzalez that pt water was attempted to AROM by house officer. Unable due to fetal station too high at this time. Will attempt again later.

## 2022-06-15 NOTE — PROGRESS NOTES
Dr Solomon Lamar updated that SVE is unchanged, station remains high, informed of pitocin rate 10mU. States to AROM when able.

## 2022-06-15 NOTE — PROGRESS NOTES
39w1d presents to l&d for a scheduled IOL for Poly. Pt denies any ctx, leaking, or bleeding. States good fetal movement. Placed on EFM. Will notify Dr Mauricia Apley of pt arrival for orders.

## 2022-06-15 NOTE — H&P
Department of Obstetrics and Gynecology  Labor and Delivery  History & Physical    Patient:  Rj Edwards     Admit Date:  6/15/2022  7:30 AM  Medical Record Number:  [id number]    CHIEF COMPLAINT: High risk multigravid with polyhydramnios for Pitocin labor induction (BS = 7)    PROBLEM LIST:     Patient Active Problem List   Diagnosis    Obesity affecting pregnancy in third trimester    High risk multigravida, third trimester    Polyhydramnios in third trimester (25.8 at 36w4d)     Previous gestational diabetes mellitus first pregnancy, antepartum, passed 2hr GTT x2    BMI 34.0-34.9,adultm(pregravid BMI 34.48)    Rh negative state in antepartum period-had rhogam    History of Manuel-Parkinson-White (WPW) syndrome- NO issues, no f/u w cardiology    History of intrauterine growth restriction in 2nd pregnancy, currently pregnant in third trimester    39 1/7 weeks gestation of pregnancy        HISTORY OF PRESENT ILLNESS:    The patient is a 27 y.o. W female [id number] at 36w3d. Patient presents with a past medical history of WPW (asymptomatic, has no cardiology follow-up) presents for Pitocin labor induction (BS = 7) secondary to polyhydramnios and obesity. First pregnancy was complicated by gestational diabetes and the second by IUGR. She denies contractions, leaking fluid and vaginal bleeding. Has no symptoms of UTI or PIH. There is good fetal movement. ESTIMATED DUE DATE: Estimated Date of Delivery: 22    PRENATAL CARE:  Complicated by: See HPI and problem list  GBS: negative    Past OB History  OB History        3    Para   2    Term   2            AB        Living   2       SAB        IAB        Ectopic        Molar        Multiple        Live Births   2                Past Medical History:        Diagnosis Date    Anxiety     Depression     Manuel-Parkinson-White syndrome     no surgery needed as infant       Past Surgical History:    History reviewed.  No pertinent surgical history. Allergies:  Patient has no known allergies. Social History:    Social History     Socioeconomic History    Marital status: Single     Spouse name: Not on file    Number of children: Not on file    Years of education: Not on file    Highest education level: Not on file   Occupational History    Not on file   Tobacco Use    Smoking status: Former Smoker     Quit date: 2016     Years since quittin.1    Smokeless tobacco: Never Used   Vaping Use    Vaping Use: Never used   Substance and Sexual Activity    Alcohol use: No     Comment: socially    Drug use: No    Sexual activity: Yes     Partners: Male   Other Topics Concern    Not on file   Social History Narrative    Not on file     Social Determinants of Health     Financial Resource Strain:     Difficulty of Paying Living Expenses: Not on file   Food Insecurity:     Worried About Running Out of Food in the Last Year: Not on file    Colin of Food in the Last Year: Not on file   Transportation Needs:     Lack of Transportation (Medical): Not on file    Lack of Transportation (Non-Medical):  Not on file   Physical Activity:     Days of Exercise per Week: Not on file    Minutes of Exercise per Session: Not on file   Stress:     Feeling of Stress : Not on file   Social Connections:     Frequency of Communication with Friends and Family: Not on file    Frequency of Social Gatherings with Friends and Family: Not on file    Attends Catholic Services: Not on file    Active Member of Clubs or Organizations: Not on file    Attends Club or Organization Meetings: Not on file    Marital Status: Not on file   Intimate Partner Violence:     Fear of Current or Ex-Partner: Not on file    Emotionally Abused: Not on file    Physically Abused: Not on file    Sexually Abused: Not on file   Housing Stability:     Unable to Pay for Housing in the Last Year: Not on file    Number of [place] in the Last Year: Not on file    Unstable Housing in the Last Year: Not on file       Family History:   History reviewed. No pertinent family history. Medications Prior to Admission:  Medications Prior to Admission: Biotin 10 MG CAPS, Take by mouth  Prenatal Vit-Fe Fumarate-FA (PRENATAL 1+1 PO), Take 1 capsule by mouth daily    REVIEW OF SYSTEMS:  CONSTITUTIONAL:  negative  RESPIRATORY:  negative  CARDIOVASCULAR:  negative  GASTROINTESTINAL:  negative  ALLERGIC/IMMUNOLOGIC:  negative  NEUROLOGICAL:  negative  BEHAVIOR/PSYCH:  negative    PHYSICAL EXAM:  Vitals:    06/15/22 0754   BP: (!) 124/90   Pulse: (!) 127   Resp: 18   Temp: 98.2 °F (36.8 °C)     General appearance:  awake, alert, cooperative, no apparent distress, and appears stated age  Neurologic:  Awake, alert, oriented to name, place and time. Skin: warm, dry, normal color, no rashes  Head:  NC,AT,NT  Eyes:  Sclerae white, pupils equal and reactive, red reflex normal bilaterally  Ears:  Well-positioned, well-formed pinnae; Hearing: intact  Nose:  Clear, normal mucosa  Throat:  Lips, tongue and mucosa are pink, moist and intact; no exudate  Neck:  Supple, symmetrical  Heart:  Regular rate & rhythm, S1 S2, no murmurs, rubs, or gallops  Lungs:  No increased work of breathing, good air exchange, CTA b/l. Abdomen:  Soft, non tender, gravid, consistent with her gestational age, EFW by Leopald's manouever was 8 pounds  Extremities: No clubbing, cyanosis, cords; No calf tenderness; Edema: no  Pulses:  Strong equal distal pulses, brisk capillary refill  Fetal heart rate:  Reassuring.   Pelvis:  Adequate pelvis  Cervix: 2-3 cm / 60% / soft / -2 / mid, Crow Score: 7-8  Contraction frequency:  none  Membranes:  Intact    Labs:  Recent Results (from the past 72 hour(s))   URINE DRUG SCREEN    Collection Time: 06/15/22  8:00 AM   Result Value Ref Range    Amphetamine Screen, Urine NOT DETECTED Negative <1000 ng/mL    Barbiturate Screen, Ur NOT DETECTED Negative < 200 ng/mL    Benzodiazepine Screen, Urine NOT DETECTED Negative < 200 ng/mL    Cannabinoid Scrn, Ur NOT DETECTED Negative < 50ng/mL    Cocaine Metabolite Screen, Urine NOT DETECTED Negative < 300 ng/mL    Opiate Scrn, Ur NOT DETECTED Negative < 300ng/mL    PCP Screen, Urine NOT DETECTED Negative < 25 ng/mL    Methadone Screen, Urine NOT DETECTED Negative <300 ng/mL    Oxycodone Urine NOT DETECTED Negative <100 ng/mL    FENTANYL SCREEN, URINE NOT DETECTED Negative <1 ng/mL    Drug Screen Comment: see below    CBC    Collection Time: 06/15/22  8:07 AM   Result Value Ref Range    WBC 11.0 4.5 - 11.5 E9/L    RBC 4.00 3.50 - 5.50 E12/L    Hemoglobin 12.3 11.5 - 15.5 g/dL    Hematocrit 37.0 34.0 - 48.0 %    MCV 92.5 80.0 - 99.9 fL    MCH 30.8 26.0 - 35.0 pg    MCHC 33.2 32.0 - 34.5 %    RDW 14.6 11.5 - 15.0 fL    Platelets 712 100 - 753 E9/L    MPV 9.2 7.0 - 12.0 fL   TYPE AND SCREEN    Collection Time: 06/15/22  8:07 AM   Result Value Ref Range    ABO/Rh A NEG     Antibody Screen NEG        ASSESSMENT:  27 y.o. W female at 36w3d   High risk multigravid with polyhydramnios  For labor induction with Pitocin (BS = 7)  Pregravid BMI 34.48  Rh-  History of GDM and prior pregnancy  History of WPW syndrome -no follow-up necessary  Patient Active Problem List   Diagnosis    Obesity affecting pregnancy in third trimester    High risk multigravida, third trimester    Polyhydramnios in third trimester (25.8 at 36w4d)     Previous gestational diabetes mellitus first pregnancy, antepartum, passed 2hr GTT x2    BMI 34.0-34.9,adultm(pregravid BMI 34.48)    Rh negative state in antepartum period-had rhogam    History of Manuel-Parkinson-White (WPW) syndrome- NO issues, no f/u w cardiology    History of intrauterine growth restriction in 2nd pregnancy, currently pregnant in third trimester    39 1/7 weeks gestation of pregnancy     Fetus: Reassuring  GBS: negative    PLAN:  Admit to labor and delivery per routine.   Continuous electronic fetal monitoring. CBC/type and screen/UDS. IV/IV fluids. Pitocin, induction protocol. Stadol 2 mg every 3 hours as needed. May have epidural at request.  Amniotomy when able. The risks of labor induction had been discussed with the patient including the increased risk of  section its associated risks. Questions were answered to her satisfaction. Informed consent was obtained to proceed with Pitocin induction of labor as planned. eLvy Lindsey MD, M.D.  FACOG  6/15/2022 10:50 AM

## 2022-06-15 NOTE — H&P
Department of Obstetrics and Gynecology  Nurse Practitioner Obstetrics History and Physical        CHIEF COMPLAINT:  Scheduled IOL    HISTORY OF PRESENT ILLNESS:    The patient is a 27 y.o. female , Patient's last menstrual period was 2021.,  at 39w1d. Pt presents to l&d as scheduled iol for polyhydramnios. Previous vaginal delivery x2. OB History        3    Para   2    Term   2            AB        Living   2       SAB        IAB        Ectopic        Molar        Multiple        Live Births   2                Estimated Due Date: Estimated Date of Delivery: 22    PRENATAL CARE:  uneventful    Complicated by:   Patient Active Problem List   Diagnosis Code    Obesity affecting pregnancy in second trimester O99.212    Decreased fetal movement affecting management of mother, antepartum O40.1    High risk multigravida, third trimester O1.45    Polyhydramnios in third trimester O40. 3XX0    Previous gestational diabetes mellitus, antepartum, passed 2hr GTT x2 O09.291, Z86.32    BMI 34.0-34.9,adultm(pregravid BMI 34.48) Z68.34    Rh negative state in antepartum period-had rhogam O26.899, Z67.91    History of Manuel-Parkinson-White (WPW) syndrome- NO issues, no f/u w cardiology Z86.79    Normal pregnancy, unspecified trimester Z34.90       PAST OB HISTORY  OB History        3    Para   2    Term   2            AB        Living   2       SAB        IAB        Ectopic        Molar        Multiple        Live Births   2                Past Medical History:        Diagnosis Date    Anxiety     Depression     Manuel-Parkinson-White syndrome     no surgery needed as infant     Past Surgical History:    History reviewed. No pertinent surgical history. Social History:    TOBACCO:   reports that she quit smoking about 6 years ago. She has never used smokeless tobacco.  ETOH:   reports no history of alcohol use. DRUGS:   reports no history of drug use.   Family History: History reviewed. No pertinent family history. Medications Prior to Admission:  Medications Prior to Admission: Biotin 10 MG CAPS, Take by mouth  Prenatal Vit-Fe Fumarate-FA (PRENATAL 1+1 PO), Take 1 capsule by mouth daily  Allergies:  Patient has no known allergies. Review of Systems:   Ears, nose, mouth, throat, and face: negative  Respiratory: negative  Cardiovascular: negative  Gastrointestinal: negative  Genitourinary:negative  Integument/breast: negative  Hematologic/lymphatic: negative  Musculoskeletal:negative  Neurological: negative  Behavioral/Psych: negative  Endocrine: negative  Allergic/Immunologic: negative  Psychosocial: negative    PHYSICAL EXAM:    General appearance:  awake, alert, cooperative, no apparent distress, and appears stated age  Neurologic:  Awake, alert, oriented to name, place and time. Cranial nerves II-XII are grossly intact. Lungs:  clear to auscultation bilaterally  Heart:  regular rate and rhythm  Abdomen:   soft, non-distended, non-tender, no masses palpated, gravid  Fetal heart rate:  Baseline Heart Rate 125, accelerations:  present, decels:none  Pelvis:  External Genitalia: no lesions  Cervix:  DILATION:  1-2 cm  EFFACEMENT:   60%  STATION:  -3  CONSISTENCY:  soft    Contraction frequency:  2-4 minutes  Membranes:  Intact    /80   Pulse 81   Temp 98.2 °F (36.8 °C)   Resp 18   LMP 09/14/2021                 Prenatal Labs  Blood Type/Rh: A neg  Antibody Screen: negative  Hemoglobin, Hematocrit, Platelets: 73.0 / 01.3 / 284  Rubella: immune  T.  Pallidum, IgG: non-reactive   Hepatitis B Surface Antigen: non-reactive   HIV: non-reactive   Sickle Cell Screen: not available  Gonorrhea: negative  Chlamydia: negative  Group B Strep: negative        ASSESSMENT AND PLAN:  D/w Dr. Jovanni Rueda  Routine admission orders  Pitocin augmentation          Electronically signed by FLORENTINO Hogan CNP on 6/15/2022 at 11:22 AM

## 2022-06-16 PROCEDURE — 7200000001 HC VAGINAL DELIVERY

## 2022-06-16 PROCEDURE — 10907ZC DRAINAGE OF AMNIOTIC FLUID, THERAPEUTIC FROM PRODUCTS OF CONCEPTION, VIA NATURAL OR ARTIFICIAL OPENING: ICD-10-PCS | Performed by: OBSTETRICS & GYNECOLOGY

## 2022-06-16 PROCEDURE — 6370000000 HC RX 637 (ALT 250 FOR IP): Performed by: OBSTETRICS & GYNECOLOGY

## 2022-06-16 PROCEDURE — 3E033VJ INTRODUCTION OF OTHER HORMONE INTO PERIPHERAL VEIN, PERCUTANEOUS APPROACH: ICD-10-PCS | Performed by: OBSTETRICS & GYNECOLOGY

## 2022-06-16 PROCEDURE — 1220000000 HC SEMI PRIVATE OB R&B

## 2022-06-16 RX ORDER — IBUPROFEN 600 MG/1
600 TABLET ORAL EVERY 6 HOURS PRN
Status: DISCONTINUED | OUTPATIENT
Start: 2022-06-16 | End: 2022-06-17 | Stop reason: HOSPADM

## 2022-06-16 RX ORDER — PRENATAL WITH FERROUS FUM AND FOLIC ACID 3080; 920; 120; 400; 22; 1.84; 3; 20; 10; 1; 12; 200; 27; 25; 2 [IU]/1; [IU]/1; MG/1; [IU]/1; MG/1; MG/1; MG/1; MG/1; MG/1; MG/1; UG/1; MG/1; MG/1; MG/1; MG/1
1 TABLET ORAL
Status: DISCONTINUED | OUTPATIENT
Start: 2022-06-16 | End: 2022-06-17 | Stop reason: HOSPADM

## 2022-06-16 RX ORDER — DOCUSATE SODIUM 100 MG/1
100 CAPSULE, LIQUID FILLED ORAL 2 TIMES DAILY
Status: DISCONTINUED | OUTPATIENT
Start: 2022-06-16 | End: 2022-06-17 | Stop reason: HOSPADM

## 2022-06-16 RX ORDER — ACETAMINOPHEN 500 MG
1000 TABLET ORAL EVERY 8 HOURS PRN
Status: DISCONTINUED | OUTPATIENT
Start: 2022-06-16 | End: 2022-06-17 | Stop reason: HOSPADM

## 2022-06-16 RX ORDER — MODIFIED LANOLIN
OINTMENT (GRAM) TOPICAL PRN
Status: DISCONTINUED | OUTPATIENT
Start: 2022-06-16 | End: 2022-06-17 | Stop reason: HOSPADM

## 2022-06-16 RX ADMIN — IBUPROFEN 600 MG: 600 TABLET ORAL at 07:48

## 2022-06-16 RX ADMIN — Medication 166.7 ML: at 00:33

## 2022-06-16 RX ADMIN — IBUPROFEN 600 MG: 600 TABLET ORAL at 15:05

## 2022-06-16 RX ADMIN — DOCUSATE SODIUM 100 MG: 100 CAPSULE, LIQUID FILLED ORAL at 07:49

## 2022-06-16 RX ADMIN — DOCUSATE SODIUM 100 MG: 100 CAPSULE, LIQUID FILLED ORAL at 20:50

## 2022-06-16 RX ADMIN — METFORMIN HYDROCHLORIDE 1 TABLET: 500 TABLET, EXTENDED RELEASE ORAL at 07:48

## 2022-06-16 RX ADMIN — IBUPROFEN 600 MG: 600 TABLET ORAL at 02:32

## 2022-06-16 ASSESSMENT — PAIN DESCRIPTION - LOCATION: LOCATION: ABDOMEN;BACK

## 2022-06-16 ASSESSMENT — PAIN SCALES - GENERAL
PAINLEVEL_OUTOF10: 3
PAINLEVEL_OUTOF10: 0
PAINLEVEL_OUTOF10: 2
PAINLEVEL_OUTOF10: 3

## 2022-06-16 ASSESSMENT — PAIN - FUNCTIONAL ASSESSMENT: PAIN_FUNCTIONAL_ASSESSMENT: ACTIVITIES ARE NOT PREVENTED

## 2022-06-16 ASSESSMENT — PAIN DESCRIPTION - DESCRIPTORS: DESCRIPTORS: CRAMPING

## 2022-06-16 ASSESSMENT — PAIN DESCRIPTION - ORIENTATION: ORIENTATION: LOWER

## 2022-06-16 NOTE — PROGRESS NOTES
0500: Provided patient with postpartum admission packet, PPD assessment scale and instructed to report intake and output of baby. Patient did not receive tDap vaccine during pregnancy and refuses upon discharge. Fundal assessment performed, firm, U/U, scant rubra discharge. Support person and baby room. Patient denies pain at this time, states they are comfortable. Call light within reach.

## 2022-06-16 NOTE — PROGRESS NOTES
of viable baby boy at 36. Nuchal x1, reduced. Apgars 9/9. Infant placed skin to skin after delayed cord clamping.

## 2022-06-16 NOTE — L&D DELIVERY NOTE
Department of Obstetrics and Gynecology  Spontaneous Vaginal Delivery Note    Patient:  Oli Bobby     Admit Date:  6/15/2022  7:30 AM  Medical Record Number:  [id number]   Procedure Date: 2022 1:20 AM     Pre-delivery Diagnosis: High risk multigravid at 39 weeks 2 days, polyhydramnios, pregravid BMI 34.48, Rh-, history of GDM and prior pregnancy, history of IUGR and prior pregnancy, history of WPW (asymptomatic)  Patient Active Problem List   Diagnosis    Obesity affecting pregnancy in third trimester    High risk multigravida, third trimester    Polyhydramnios in third trimester (25.8 at 36w4d)     Previous gestational diabetes mellitus first pregnancy, antepartum, passed 2hr GTT x2    BMI 34.0-34.9,adultm(pregravid BMI 34.48)    Rh negative state in antepartum period-had rhogam    History of Manuel-Parkinson-White (WPW) syndrome- NO issues, no f/u w cardiology    History of intrauterine growth restriction in 2nd pregnancy, currently pregnant in third trimester    39 2/7 weeks gestation of pregnancy     Post-delivery Diagnosis:  Living  infant Male  Patient Active Problem List   Diagnosis    Obesity affecting pregnancy in third trimester    High risk multigravida, third trimester    Polyhydramnios in third trimester (25.8 at 36w4d)     Previous gestational diabetes mellitus first pregnancy, antepartum, passed 2hr GTT x2    BMI 34.0-34.9,adultm(pregravid BMI 34.48)    Rh negative state in antepartum period-had rhogam    History of Manuel-Parkinson-White (WPW) syndrome- NO issues, no f/u w cardiology    History of intrauterine growth restriction in 2nd pregnancy, currently pregnant in third trimester    39 2/7 weeks gestation of pregnancy     (normal spontaneous vaginal delivery)    Term birth of  male       Information for the patient's :  Lorne De La Rosa [[id number]]   Normal Spontaneous Vaginal Delivery, uncomplicated     Delivering Clinician: Valeria Mullen Infant Wt:   Information for the patient's :  Ericka Jaeger [[id number]]   7 pounds 8 ounces  3400 g    APGARS:     Information for the patient's :  Ericka Jaeger [[id number]]   1 minute: 9  5 minute: 9    Anesthesia:  none    Application and Delivery:  27 y.o. W female [id number] at 39w2d admitted with polyhydramnios, obesity and a history of GDM and IUGR in prior pregnancies for labor induction with Pitocin (BS = 7-8) who progressed to complete post amniotomy and delivered Cephalic, left occiput anterior presentation via  @ 0027, under no anesthesia,  over an intact perineum without a resulting laceration, without dystocia or complication, a Live Born Male infant, weighing 7# 8oz, 3400 grams, Clear fluid at delivery, bulb suctioned on perineum. A nuchal cord was not present. A delayed cord clamping was performed. Spontaneous cry,  Apgar's 1 minute:  9; 5 minute:  9. The placenta was delivered with gentle traction and was noted to be intact, whole and that the umbilical cord had three vessels noted. Episiotomy was not needed. Repair not necessary. Cervix, rectum, sphincter intact. Sponge, needle, and instrument counts correct x 2.     Delivery Summary:  Mother's Information    Labor Events     Labor?: No  Cervical Ripening:  N/A  Now      Mckenzie Hickey [[id number]]    Labor Events     Labor?: No  Cervical Ripening Date/Time:  N/A    Rupture Date/Time: 6/15/22 17:05:00   Rupture Type: AROM  Fluid Color: Clear  Fluid Odor: None  Fluid Volume: Large  Induction: Oxytocin  Augmentation: AROM  Labor Complications: None     Anesthesia    Method: None     Start Pushing    Labor onset date/time: 6/15/22 19:00:00 Now   Dilation complete date/time: 22 00:27:00 Now   Start pushing date/time: 2022 00:27:00      Labor Length    1st stage: 5h 27m  2nd stage: 0h 00m  3rd stage: 0h 06m     Delivery ([place])    Delivery Date/Time:  22 00:27:00   Delivery Type: Vaginal, Spontaneous      Presentation    Presentation: Vertex  Position: Left  _: Occiput  _: Anterior     Shoulder Dystocia    Shoulder Dystocia Present?: No     Assisted Delivery Details    Forceps Attempted?: No  Vacuum Extractor Attempted?: No     Cord    Vessels: 3 Vessels  Complications: Nuchal Loose  Cord Around: Head  Delayed Cord Clamping?: Yes  Cord Clamped Date/Time: 2022 00:28:00  Cord Blood Disposition: Lab  Gases Sent?: Yes     Placenta    Date/Time: 2022 00:33:00  Removal: Spontaneous  Appearance: Intact  Disposition: Placenta Refrigerator     Lacerations    Episiotomy: None  Perineal Lacerations: None  Other Lacerations: no non-perineal laceration     Vaginal Counts    Initial Count Verified By: Ally Neri    Sponges Needles Instruments   Initial Counts Correct Correct Correct   Final Counts Correct Correct Correct   Final Count Personnel: Amilcar Ortega  Final Count Verified By: Ally Neri  Accurate Final Count?: Yes  If the count is incorrect due to Intentionally Retained Foreign Object (IRFO) add the IRFO LDA in Lines/Drains.   Add LDA: Link to [address] [place] [address]     Blood Loss  Mother: Gilford Skene #[id number]   Start of Mother's Information    Delivery Blood Loss  06/15/22 1900 - 22 0027    Quantitative Blood Loss (mL) Hospital Encounter 202 grams    Total  202 mL      End of Mother's Information  Mother: Gilford Skene #[id number]       Delivery Providers    Delivering clinician: Юлия Rosa MD     Provider Role    Юлия Rosa MD Obstetrician    Jane Garcia, RN Primary Nurse    Og Conklin, RN Primary  Nurse          [place] Assessment    Living Status: Living     Apgar Scoring Key:    0 1 2    Skin Color: Blue or pale Acrocyanotic Completely pink    Heart Rate: Absent <100 bpm >100 bpm    Reflex Irritability: No response Grimace Cry or active withdrawal    Muscle Tone: Limp Some flexion Active motion    Respiratory Effort: Absent Weak cry; hypoventilation Good, crying Skin Color:   Heart Rate:   Reflex Irritability:   Muscle Tone:   Respiratory Effort: Total:            1 Minute:    1    2    2    2    2    9        Apgar 1 total from OB History    5 Minute:    1    2    2    2    2    9        Apgar 5 total from OB History    10 Minute:              15 Minute:              20 Minute:                      Apgars Assigned By: BROTHERS          Resuscitation    Method: Bulb Suction, Room Air, Stimulation          Measurements    Birth Weight: 3400 g Birth Length: 0.495 m   Head Circumference: 0.355 m           Title    Skin to Skin Initiation Date/Time: 22 00:27:00 EDT   Skin to Skin With: Mother   Skin to Skin End Date/Time:     Breastfeeding Initiated Date/Time: 2022 00:40:00           Specimen: None. Quantitative blood loss: 202 mL    Condition:  infant stable to general nursery and mother stable to maternity    Blood Type and Rh: A NEG    Rubella Immunity Status:   Immune           Infant Feeding:    breast    Attending Attestation: I performed the procedure.     Shahriar Cole MD MD, FACOG  2022 1:20 AM

## 2022-06-16 NOTE — PROGRESS NOTES
Talked with dr Ana Laura Odom, asked for internal monitors. Pt size and difficulty tracing uc's.  Orders received told him pt on 18 mu pitocin and is 3 cm as of 1900

## 2022-06-17 VITALS
SYSTOLIC BLOOD PRESSURE: 129 MMHG | DIASTOLIC BLOOD PRESSURE: 81 MMHG | OXYGEN SATURATION: 99 % | TEMPERATURE: 98.3 F | HEART RATE: 88 BPM | RESPIRATION RATE: 18 BRPM

## 2022-06-17 PROBLEM — Z86.32: Status: RESOLVED | Noted: 2022-06-10 | Resolved: 2022-06-17

## 2022-06-17 PROBLEM — O26.899 RH NEGATIVE STATE IN ANTEPARTUM PERIOD: Status: RESOLVED | Noted: 2022-06-10 | Resolved: 2022-06-17

## 2022-06-17 PROBLEM — O36.8190 DECREASED FETAL MOVEMENT AFFECTING MANAGEMENT OF MOTHER, ANTEPARTUM: Status: RESOLVED | Noted: 2018-11-14 | Resolved: 2022-06-17

## 2022-06-17 PROBLEM — O09.293 HISTORY OF INTRAUTERINE GROWTH RESTRICTION IN PRIOR PREGNANCY, CURRENTLY PREGNANT IN THIRD TRIMESTER: Status: RESOLVED | Noted: 2022-06-15 | Resolved: 2022-06-17

## 2022-06-17 PROBLEM — Z67.91 RH NEGATIVE STATE IN ANTEPARTUM PERIOD: Status: RESOLVED | Noted: 2022-06-10 | Resolved: 2022-06-17

## 2022-06-17 PROBLEM — O09.43 HIGH RISK MULTIGRAVIDA, THIRD TRIMESTER: Status: RESOLVED | Noted: 2022-06-10 | Resolved: 2022-06-17

## 2022-06-17 PROBLEM — O40.3XX0 POLYHYDRAMNIOS IN THIRD TRIMESTER: Status: RESOLVED | Noted: 2022-06-10 | Resolved: 2022-06-17

## 2022-06-17 PROBLEM — Z3A.39 39 WEEKS GESTATION OF PREGNANCY: Status: RESOLVED | Noted: 2022-06-17 | Resolved: 2022-06-17

## 2022-06-17 PROBLEM — O09.293 HISTORY OF INTRAUTERINE GROWTH RESTRICTION IN PRIOR PREGNANCY, CURRENTLY PREGNANT IN THIRD TRIMESTER: Status: ACTIVE | Noted: 2022-06-15

## 2022-06-17 PROBLEM — Z3A.39 39 WEEKS GESTATION OF PREGNANCY: Status: ACTIVE | Noted: 2022-06-17

## 2022-06-17 PROBLEM — O09.291: Status: RESOLVED | Noted: 2022-06-10 | Resolved: 2022-06-17

## 2022-06-17 PROCEDURE — 6370000000 HC RX 637 (ALT 250 FOR IP): Performed by: OBSTETRICS & GYNECOLOGY

## 2022-06-17 RX ORDER — DOCUSATE SODIUM 100 MG/1
100 CAPSULE, LIQUID FILLED ORAL 2 TIMES DAILY PRN
COMMUNITY
Start: 2022-06-17

## 2022-06-17 RX ORDER — MODIFIED LANOLIN
1 OINTMENT (GRAM) TOPICAL PRN
COMMUNITY
Start: 2022-06-17

## 2022-06-17 RX ORDER — IBUPROFEN 600 MG/1
600 TABLET ORAL EVERY 6 HOURS PRN
Qty: 60 TABLET | Refills: 1 | Status: SHIPPED | OUTPATIENT
Start: 2022-06-17

## 2022-06-17 RX ADMIN — DOCUSATE SODIUM 100 MG: 100 CAPSULE, LIQUID FILLED ORAL at 09:06

## 2022-06-17 RX ADMIN — METFORMIN HYDROCHLORIDE: 500 TABLET, EXTENDED RELEASE ORAL at 09:05

## 2022-06-17 RX ADMIN — IBUPROFEN 600 MG: 600 TABLET ORAL at 04:03

## 2022-06-17 ASSESSMENT — PAIN DESCRIPTION - ORIENTATION: ORIENTATION: LEFT

## 2022-06-17 ASSESSMENT — PAIN DESCRIPTION - FREQUENCY: FREQUENCY: INTERMITTENT

## 2022-06-17 ASSESSMENT — PAIN SCALES - GENERAL: PAINLEVEL_OUTOF10: 3

## 2022-06-17 ASSESSMENT — PAIN DESCRIPTION - ONSET: ONSET: GRADUAL

## 2022-06-17 ASSESSMENT — PAIN DESCRIPTION - DESCRIPTORS: DESCRIPTORS: SORE;ACHING;DISCOMFORT

## 2022-06-17 ASSESSMENT — PAIN DESCRIPTION - RADICULAR PAIN: RADICULAR_PAIN: ABSENT

## 2022-06-17 ASSESSMENT — PAIN DESCRIPTION - LOCATION: LOCATION: FLANK

## 2022-06-17 ASSESSMENT — PAIN - FUNCTIONAL ASSESSMENT: PAIN_FUNCTIONAL_ASSESSMENT: ACTIVITIES ARE NOT PREVENTED

## 2022-06-17 ASSESSMENT — PAIN DESCRIPTION - PAIN TYPE: TYPE: ACUTE PAIN

## 2022-06-17 NOTE — PROGRESS NOTES
Pt up in room, taking care of infants needs, independent with personal care. Denies need for pain medication at this time. FOB at bedside offering support. Call light in reach.

## 2022-06-17 NOTE — PLAN OF CARE
Problem: Vaginal Birth or  Section  Goal: Fetal and maternal status remain reassuring during the birth process  Description:  Birth OB-Pregnancy care plan goal which identifies if the fetal and maternal status remain reassuring during the birth process  Outcome: Progressing     Problem: Pain  Goal: Verbalizes/displays adequate comfort level or baseline comfort level  Outcome: Progressing  Flowsheets (Taken 2022)  Verbalizes/displays adequate comfort level or baseline comfort level:   Encourage patient to monitor pain and request assistance   Assess pain using appropriate pain scale   Administer analgesics based on type and severity of pain and evaluate response   Implement non-pharmacological measures as appropriate and evaluate response   Consider cultural and social influences on pain and pain management   Notify Licensed Independent Practitioner if interventions unsuccessful or patient reports new pain     Problem: Infection - Adult  Goal: Absence of infection during hospitalization  Outcome: Progressing     Problem: Safety - Adult  Goal: Free from fall injury  Outcome: Progressing     Problem: Discharge Planning  Goal: Discharge to home or other facility with appropriate resources  Outcome: Progressing

## 2022-06-17 NOTE — DISCHARGE INSTR - ACTIVITY
After Your Delivery Discharge Instructions    What to do after you leave the hospital:    Recommended diet: regular diet  Recommended activity: No driving for 1 weeks, no sex or tampon use for 6 weeks. No douching. No heavy lifting (greater than baby and carrier) for 6 weeks. Initially, avoid frequent ambulation with stairs - start slowly then increase as tolerated. You may return to work in 6 weeks. Showers are fine - avoid bath tubs, hot tubs, swimming. Follow-up in 6 weeks for final postpartum visit. Call the Physician with any of these signs and symptoms:    Warning signs regarding stitches:  \"Popping\" of stitches  Foul smelling discharge or pus  More redness or streaks around stitches than before    Perineum / episiotomy care:  Continue care as in the hospital (sitz baths, squirt bottle, etc.)  No tub baths until OK'd by your Physician , showers are fine     After your delivery - signs and symptoms to watch for:  Fever - Oral temperature greater than 100.4 degrees Fahrenheit  Foul-smelling vaginal discharge  Headache unrelieved by \"pain meds\"  Difficulty urinating  Breasts reddened, hard, hot to the touch  Nipple discharge which is foul-smelling or contains pus  Increased pain at the site of the  vaginal area  Difficulty breathing with or without chest pain  New calf pain especially if only on one side  Sudden, continuing increased vaginal bleeding (heavier than a period) with or without clots  Unrelieved feelings of:   Inability to cope  Sadness  Anxiety  Lack of interest in baby  Insomnia  Crying     What to do at home:  Resume activity gradually   Don't lift anything heavier than baby and carrier until OK'd by your Physician   No sex until OK'd by your Physician  Eat regular nutritious meals  Take pain medication as prescribed whenever you need them  To avoid/relieve constipation take stool softeners if advised   Increase fiber in your diet    Most importantly ENJOY your Baby!  - Dr. Helena Bearden =)))    Please

## 2022-06-17 NOTE — DISCHARGE INSTR - DIET
Good nutrition is important when healing from an illness, injury, or surgery. Follow any nutrition recommendations given to you during your hospital stay. If you were given an oral nutrition supplement while in the hospital, continue to take this supplement at home. You can take it with meals, in-between meals, and/or before bedtime. These supplements can be purchased at most local grocery stores, pharmacies, and chain [employer]stores. If you have any questions about your diet or nutrition, call the hospital and ask for the dietitian.

## 2022-06-17 NOTE — PROGRESS NOTES
PPD #1     Patient:  Santhosh Pearl     Admit Date:  6/15/2022  7:30 AM  Medical Record Number:  [id number]   Today's Date: 6/17/2022    S: No complaints -doing well, would like a day #1 discharge; tolerating diet: yes -General; ambulating well: yes -up in room and in halls; voiding without difficulty:  yes -has no urinary complaints; bm: denies urge; flatus: yes -normal; pain meds appropriate: yes - Ibuprofen 600 mg; vaginal bleeding: Less than a period.     O:   Vitals:    06/16/22 1150 06/16/22 1749 06/16/22 2045 06/16/22 2307   BP: 126/83 115/71 124/80 127/87   Pulse: 70 74 99 82   Resp: 16 16 16 16   Temp: 98 °F (36.7 °C) 98.6 °F (37 °C) 98.3 °F (36.8 °C) 98.3 °F (36.8 °C)   TempSrc: Oral Oral Oral Oral   SpO2:    99%     Gen: A&O, cooperative, pleasant   Heart: RRR   Lungs: CTA b/l   Abd; soft, NT, non distended, +BS  Back: no CVA or paraspinal tenderness   Ext: No clubbing, cyanosis, edema:no , no cords palpable, no calf tenderness   Neuro: intact   Uterus: firm, well contracted, nt   Perineum: intact, healing well   [place] pad: staining only, thin lochia    Lab Results   Component Value Date    HGB 12.3 06/15/2022    HCT 37.0 06/15/2022      Recent Results (from the past 72 hour(s))   URINE DRUG SCREEN    Collection Time: 06/15/22  8:00 AM   Result Value Ref Range    Amphetamine Screen, Urine NOT DETECTED Negative <1000 ng/mL    Barbiturate Screen, Ur NOT DETECTED Negative < 200 ng/mL    Benzodiazepine Screen, Urine NOT DETECTED Negative < 200 ng/mL    Cannabinoid Scrn, Ur NOT DETECTED Negative < 50ng/mL    Cocaine Metabolite Screen, Urine NOT DETECTED Negative < 300 ng/mL    Opiate Scrn, Ur NOT DETECTED Negative < 300ng/mL    PCP Screen, Urine NOT DETECTED Negative < 25 ng/mL    Methadone Screen, Urine NOT DETECTED Negative <300 ng/mL    Oxycodone Urine NOT DETECTED Negative <100 ng/mL    FENTANYL SCREEN, URINE NOT DETECTED Negative <1 ng/mL    Drug Screen Comment: see below    CBC    Collection Time: 06/15/22 8: 07 AM   Result Value Ref Range    WBC 11.0 4.5 - 11.5 E9/L    RBC 4.00 3.50 - 5.50 E12/L    Hemoglobin 12.3 11.5 - 15.5 g/dL    Hematocrit 37.0 34.0 - 48.0 %    MCV 92.5 80.0 - 99.9 fL    MCH 30.8 26.0 - 35.0 pg    MCHC 33.2 32.0 - 34.5 %    RDW 14.6 11.5 - 15.0 fL    Platelets 971 557 - 115 E9/L    MPV 9.2 7.0 - 12.0 fL   TYPE AND SCREEN    Collection Time: 06/15/22  8:07 AM   Result Value Ref Range    ABO/Rh A NEG     Antibody Screen NEG        A: 27 y.o. female  at 39w2d weeks  PPD #1 S/P ; Episiotomy was not needed  Patient Active Problem List   Diagnosis    Obesity affecting pregnancy in third trimester    High risk multigravida, third trimester    Polyhydramnios in third trimester (25.8 at 36w4d)     Previous gestational diabetes mellitus first pregnancy, antepartum, passed 2hr GTT x2    BMI 34.0-34.9,adultm(pregravid BMI 34.48)    Rh negative state in antepartum period-had rhogam    History of Manuel-Parkinson-White (WPW) syndrome- NO issues, no f/u w cardiology    History of intrauterine growth restriction in 2nd pregnancy, currently pregnant in third trimester    39 2/7 weeks gestation of pregnancy     (normal spontaneous vaginal delivery)    Term birth of  male       P: Routine PP care. Discharge home with instructions, precautions. Prescription for Ibuprofen 600 mg. Continue PNV with Iron daily. Follow-up office 6 weeks for postpartum visit.     Kathleen Resendiz MD FACOG  2022 8:46 AM

## 2022-06-17 NOTE — DISCHARGE SUMMARY
Department of Obstetrics and Gynecology  Labor and Delivery  Discharge Summary    Patient:  Freeman Vides         Medical Record Number:  [id number]    Admit Date:  6/15/2022  7:30 AM    Discharge Date: 2022    Final Diagnosis:   Active Problems:    BMI 34.0-34.9,adultm(pregravid BMI 34.48)     (normal spontaneous vaginal delivery)    Term birth of  male    History of Manuel-Parkinson-White (WPW) syndrome- NO issues, no f/u w cardiology  Resolved Problems:    High risk multigravida, third trimester    39 2/7 weeks gestation of pregnancy    Polyhydramnios in third trimester (25.8 at 38w3d)     Obesity affecting pregnancy in third trimester    Previous gestational diabetes mellitus first pregnancy, antepartum, passed 2hr GTT x2    Rh negative state in antepartum period-had rhogam    History of intrauterine growth restriction in 2nd pregnancy, currently pregnant in third trimester      Chief Complaint - Presenting Illness - Physical Findings:   Normal pregnancy, unspecified trimester [Z34.90]  39 weeks gestation of pregnancy [Z3A.39]     HPI: The patient is a 27 y.o. W female [id number] at 36w3d. Patient presents with a past medical history of WPW (asymptomatic, has no cardiology follow-up) presents for Pitocin labor induction (BS = 7) secondary to polyhydramnios and obesity. First pregnancy was complicated by gestational diabetes and the second by IUGR. She denies contractions, leaking fluid and vaginal bleeding. Has no symptoms of UTI or PIH. There is good fetal movement.     Hospital Course:   Delivery Summary:  Procedure Date: 2022 1:20 AM      Pre-delivery Diagnosis: High risk multigravid at 39 weeks 2 days, polyhydramnios, pregravid BMI 34.48, Rh-, history of GDM and prior pregnancy, history of IUGR and prior pregnancy, history of WPW (asymptomatic)      Patient Active Problem List   Diagnosis    Obesity affecting pregnancy in third trimester    High risk multigravida, third trimester    Polyhydramnios in third trimester (25.8 at 38w3d)     Previous gestational diabetes mellitus first pregnancy, antepartum, passed 2hr GTT x2    BMI 34.0-34.9,adultm(pregravid BMI 34.48)    Rh negative state in antepartum period-had rhogam    History of Manuel-Parkinson-White (WPW) syndrome- NO issues, no f/u w cardiology    History of intrauterine growth restriction in 2nd pregnancy, currently pregnant in third trimester    39 2/7 weeks gestation of pregnancy      Post-delivery Diagnosis:  Living  infant Male      Patient Active Problem List   Diagnosis    Obesity affecting pregnancy in third trimester    High risk multigravida, third trimester    Polyhydramnios in third trimester (25.8 at 36w4d)     Previous gestational diabetes mellitus first pregnancy, antepartum, passed 2hr GTT x2    BMI 34.0-34.9,adultm(pregravid BMI 34.48)    Rh negative state in antepartum period-had rhogam    History of Manuel-Parkinson-White (WPW) syndrome- NO issues, no f/u w cardiology    History of intrauterine growth restriction in 2nd pregnancy, currently pregnant in third trimester    39 2/7 weeks gestation of pregnancy     (normal spontaneous vaginal delivery)    Term birth of  male         Information for the patient's :  Arthuro Brush [[id number]]   Normal Spontaneous Vaginal Delivery, uncomplicated  Delivering Clinician: Clint Nicole Wt:   Information for the patient's :  Carol Art Pond [[id number]]   7 pounds 8 ounces  3400 g     APGARS:         Information for the patient's :  Arthuro Brush [[id number]]   1 minute: 9  5 minute: 9     Anesthesia:  none     Application and Delivery:  27 y.o.  W female [id number] at 39w2d admitted with polyhydramnios, obesity and a history of GDM and IUGR in prior pregnancies for labor induction with Pitocin (BS = 7-8) who progressed to complete post amniotomy and delivered Cephalic, left occiput anterior presentation via  @ 3559, under no anesthesia,  over an intact perineum without a resulting laceration, without dystocia or complication, a Live Born Male infant, weighing 7# 8oz, 3400 grams, Clear fluid at delivery, bulb suctioned on perineum. A nuchal cord was not present. A delayed cord clamping was performed. Spontaneous cry,  Apgar's 1 minute:  9; 5 minute:  9. The placenta was delivered with gentle traction and was noted to be intact, whole and that the umbilical cord had three vessels noted. Episiotomy was not needed. Repair not necessary. Cervix, rectum, sphincter intact.  Sponge, needle, and instrument counts correct x 2.     Delivery Summary:  Mother's Information    Labor Events     Labor?: No         Cervical Ripening:  N/A   Now        Trinna Cones Enzo Aj [[id number]]    Labor Events     Labor?: No         Cervical Ripening Date/Time:  N/A      Rupture Date/Time: 6/15/22 17:05:00   Rupture Type: AROM  Fluid Color: Clear  Fluid Odor: None  Fluid Volume: Large  Induction: Oxytocin  Augmentation: AROM  Labor Complications: None      Anesthesia    Method: None                    Start Pushing    Labor onset date/time: 6/15/22 19:00:00 Now           Dilation complete date/time: 22 00:27:00 Now    Start pushing date/time: 2022 00:27:00       Labor Length    1st stage: 5h 27m  2nd stage: 0h 00m  3rd stage: 0h 06m                  Delivery ([facility])    Delivery Date/Time:  22 00:27:00   Delivery Type: Vaginal, Spontaneous      [facility] Presentation    Presentation: Vertex  Position: Left  _: Occiput  _: Anterior      Shoulder Dystocia    Shoulder Dystocia Present?: No      Assisted Delivery Details    Forceps Attempted?: No  Vacuum Extractor Attempted?: No      Cord    Vessels: 3 Vessels  Complications: Nuchal Loose  Cord Around: Head  Delayed Cord Clamping?: Yes  Cord Clamped Date/Time: 2022 00:28:00  Cord Blood Disposition: Lab  Gases Sent?: Yes      Placenta    Date/Time: 2022 00:33:00  Removal: Spontaneous  Appearance: Intact  Disposition: Placenta Refrigerator      Lacerations    Episiotomy: None  Perineal Lacerations: None  Other Lacerations: no non-perineal laceration      Vaginal Counts    Initial Count Verified By: JALIL    Sponges Needles Instruments   Initial Counts Correct Correct Correct   Final Counts Correct Correct Correct   Final Count Personnel: Johnna Bautista  Final Count Verified By: Maren Doran  Accurate Final Count?: Yes  If the count is incorrect due to Intentionally Retained Foreign Object (IRFO) add the IRFO LDA in Lines/Drains. Add LDA: Link to [facility]             Blood Loss  Mother: Shilpi Almendarez #[id number]          Start of Mother's Information           Delivery Blood Loss  06/15/22 1900 - 22 0027            Quantitative Blood Loss (mL) Hospital Encounter 202 grams     Total   202 mL                 End of Mother's Information  Mother: Shilpi Almendarez #[id number]             Delivery Providers    Delivering clinician: Lillian Ayers MD       Provider Role     Lillian Ayers MD Obstetrician     Kevin Dexter RN Primary Nurse     Blayne Page RN Primary [place] Nurse            [place] Assessment    Living Status: Living             Apgar Scoring Key:    0 1 2     Skin Color: Blue or pale Acrocyanotic Completely pink     Heart Rate: Absent <100 bpm >100 bpm     Reflex Irritability: No response Grimace Cry or active withdrawal     Muscle Tone: Limp Some flexion Active motion     Respiratory Effort: Absent Weak cry; hypoventilation Good, crying                    Skin Color:   Heart Rate:   Reflex Irritability:   Muscle Tone:   Respiratory Effort:    Total:            1 Minute:    1    2    2    2    2    9        Apgar 1 total from OB History    5 Minute:    1    2    2    2    2    9        Apgar 5 total from OB History    10 Minute:              15 Minute:              20 Minute:                      Apgars Assigned By: Maren Doran            Resuscitation Method: Bulb Suction, Room Air, Stimulation                Measurements    Birth Weight: 3400 g Birth Length: 0.495 m   Head Circumference: 0.355 m                    Title          Skin to Skin Initiation Date/Time: 22 00:27:00 EDT          Skin to Skin With: Mother         Skin to Skin End Date/Time:        Breastfeeding Initiated Date/Time: 2022 00:40:00             Specimen: None. Quantitative blood loss: 202 mL     Condition:  infant stable to general nursery and mother stable to maternity     Blood Type and Rh: A NEG     Rubella Immunity Status:   Immune           Infant Feeding:    breast     Attending Attestation: I performed the procedure. The patient had an unremarkable Hospital Course and requested a day #1 discharge. Her care was advanced per routine protocol. Her vital signs were stable, she remained afebrile, and her physical exam was unremarkable throughout her hospitalization. She was subsequently discharged home on the first Hospital Day as she was tolerating her diet, ambulating well, voiding without difficulty and had appropriate flatus without urge for a bowel movement.     Recent Results (from the past 72 hour(s))   URINE DRUG SCREEN    Collection Time: 06/15/22  8:00 AM   Result Value Ref Range    Amphetamine Screen, Urine NOT DETECTED Negative <1000 ng/mL    Barbiturate Screen, Ur NOT DETECTED Negative < 200 ng/mL    Benzodiazepine Screen, Urine NOT DETECTED Negative < 200 ng/mL    Cannabinoid Scrn, Ur NOT DETECTED Negative < 50ng/mL    Cocaine Metabolite Screen, Urine NOT DETECTED Negative < 300 ng/mL    Opiate Scrn, Ur NOT DETECTED Negative < 300ng/mL    PCP Screen, Urine NOT DETECTED Negative < 25 ng/mL    Methadone Screen, Urine NOT DETECTED Negative <300 ng/mL    Oxycodone Urine NOT DETECTED Negative <100 ng/mL    FENTANYL SCREEN, URINE NOT DETECTED Negative <1 ng/mL    Drug Screen Comment: see below    CBC    Collection Time: 06/15/22  8:07 AM Result Value Ref Range    WBC 11.0 4.5 - 11.5 E9/L    RBC 4.00 3.50 - 5.50 E12/L    Hemoglobin 12.3 11.5 - 15.5 g/dL    Hematocrit 37.0 34.0 - 48.0 %    MCV 92.5 80.0 - 99.9 fL    MCH 30.8 26.0 - 35.0 pg    MCHC 33.2 32.0 - 34.5 %    RDW 14.6 11.5 - 15.0 fL    Platelets 964 253 - 367 E9/L    MPV 9.2 7.0 - 12.0 fL   TYPE AND SCREEN    Collection Time: 06/15/22  8:07 AM   Result Value Ref Range    ABO/Rh A NEG     Antibody Screen NEG          Physicians Following Patient During Hospitalization - Reason For Care:  Admitting Physician: Jennifer Alves  Delivering Physician: Sachin Ramírez Physician(s):    PP#1 Jennifer Alves  Discharging Physician: Jennifer Alves  Consultant(s): n/a    Discharge Condition:  · Level of Function:  Stable  · Caregiver Arrangements and Educational Efforts: Written Discharge Instructions were verbally reviewed and given to the Patient. · Special Problems: None    Plans For Continuing Care:  · Unreported Test Results and Intended Follow-Up: 6 week(s) time. · Instructions for Physical Activity: No driving for 1 week(s). No sex or tampon use for 6 weeks. No douching. No heavy lifting (greater than baby and carrier) for 6 weeks. Frequent ambulation with stairs - start slowly then increase as tolerated. Return to work in 6 weeks. Showers are fine - avoid bath tubs, hot tubs, swimming. · Instructions for Diet: Regular diet  · Discharge Medications:  Current Discharge Medication List      START taking these medications    Details   docusate sodium (COLACE) 100 MG capsule Take 1 capsule by mouth 2 times daily as needed for Constipation      ibuprofen (ADVIL;MOTRIN) 600 MG tablet Take 1 tablet by mouth every 6 hours as needed for Pain  Qty: 60 tablet, Refills: 1      lansinoh lanolin CREA ointment Apply 1 applicator topically as needed for Dry Skin (nipple discomfort)      witch hazel-glycerin (TUCKS) pad Place rectally as needed.   Refills: 0         CONTINUE these medications which have NOT CHANGED Details   Prenatal Vit-Fe Fumarate-FA (PRENATAL 1+1 PO) Take 1 capsule by mouth daily         STOP taking these medications       Biotin 10 MG CAPS Comments:   Reason for Stopping:              Follow-Up Visit Plan: In 6 weeks for final postpartum visit.  Disposition: Home. Time Spent on Discharge:  30 minutes were spent in patient examination, evaluation, counseling as well as medication reconciliation, prescriptions for required medications, discharge plan and follow up.       Seymour Francis MD MD,FACOG    6/17/2022 8:52 AM

## 2022-06-17 NOTE — PROGRESS NOTES
Hearing screening results were discussed with parent. Questions answered. Brochure given to parent. Advised to monitor developmental milestones and contact physician for any concerns.    Electronically signed by Richard Ellison on 6/17/2022 at 9:30 AM

## 2022-06-17 NOTE — PROGRESS NOTES
Patient had a plum size clot, which broke apart. Instructed patient to call if she has anymore that size or larger.

## 2024-04-08 ENCOUNTER — OFFICE VISIT (OUTPATIENT)
Dept: BARIATRICS/WEIGHT MGMT | Age: 29
End: 2024-04-08
Payer: MEDICAID

## 2024-04-08 VITALS
SYSTOLIC BLOOD PRESSURE: 124 MMHG | BODY MASS INDEX: 40.4 KG/M2 | HEIGHT: 63 IN | WEIGHT: 228 LBS | DIASTOLIC BLOOD PRESSURE: 81 MMHG | TEMPERATURE: 98.4 F | HEART RATE: 94 BPM

## 2024-04-08 DIAGNOSIS — K21.9 GASTROESOPHAGEAL REFLUX DISEASE, UNSPECIFIED WHETHER ESOPHAGITIS PRESENT: Primary | ICD-10-CM

## 2024-04-08 DIAGNOSIS — E66.01 CLASS 3 SEVERE OBESITY DUE TO EXCESS CALORIES WITHOUT SERIOUS COMORBIDITY WITH BODY MASS INDEX (BMI) OF 40.0 TO 44.9 IN ADULT (HCC): ICD-10-CM

## 2024-04-08 PROCEDURE — G8428 CUR MEDS NOT DOCUMENT: HCPCS | Performed by: INTERNAL MEDICINE

## 2024-04-08 PROCEDURE — 1036F TOBACCO NON-USER: CPT | Performed by: INTERNAL MEDICINE

## 2024-04-08 PROCEDURE — 99202 OFFICE O/P NEW SF 15 MIN: CPT

## 2024-04-08 PROCEDURE — 99205 OFFICE O/P NEW HI 60 MIN: CPT | Performed by: INTERNAL MEDICINE

## 2024-04-08 PROCEDURE — G8417 CALC BMI ABV UP PARAM F/U: HCPCS | Performed by: INTERNAL MEDICINE

## 2024-04-08 RX ORDER — VENLAFAXINE HYDROCHLORIDE 37.5 MG/1
37.5 CAPSULE, EXTENDED RELEASE ORAL DAILY
Qty: 60 CAPSULE | Refills: 1 | Status: SHIPPED | OUTPATIENT
Start: 2024-04-08

## 2024-04-08 NOTE — PROGRESS NOTES
CC -   GERD, Obesity    BACKGROUND -   First visit: 04/08/24    Obesity   Began at age 22  Initial BMI 41.0, Wt 228.0 lbs, Ht 5' 2.5\"  HS Grad wt 165 lbs   Lowest   wt 165 lbs   Highest  wt 230 lbs  Pattern of wt gain: grad with rapid increases during pregnancies  Wt change past yr: up 15 lbs  Most wt lost: 25 lbs (phentermine + eat less one time, limit food to chicken and salad a second time)  Other diets attempted: Calorie counting; no dieticianNica    Desire to lose weight: 9/10  Problem posed by appetite: 5/10    Initial Diet:    Number of meals per day - 2    Number of snacks per day - 2    Meal volume - 7\" plate, no seconds    Fast food/convenience store - 2-3x/week    Hospital cafeteria - 2-5x/week (lunch)    Restaurants (not fast food) - 0x/week   Sweets - 1-2d/week (one fudge pop, 100 gretta worth of chocolate covered strawberries)   Chips - 0d/week   Crackers/pretzels - 0d/week   Nuts - 0d/week   Peanut Butter - 1-2d/week (as snack, 1 Tbsp with something, like a banana)   Popcorn - 0d/week   Dried fruit - 0d/week   Whole fruit - 4-5d/week (one serving)   Breakfast cereal - 0d/week   Granola/Protein/Energy bar - 0d/week   Sugar sweetened beverages - 8 oz juice/wk, 1 med DD caramel iced coffee (200 gretta per pt report) 3-4d/wk   Protein - No supplements   Fiber - No supplements     Exercise:    Gym membership - Workplace gym    Walking - 60 walking 3-5d/wk    Running - 10 min 3-5d/wk    Resistance - 30-40 min 3-5d/wk    Aerobic class - none       Eliptical - 10 min 3-5d/wk  ______________________      PFSH -  Past Medical History:   Diagnosis Date    Anxiety     Depression     GERD (gastroesophageal reflux disease)     Obesity     Manuel-Parkinson-White syndrome     no surgery needed as infant     No current outpatient medications on file.     No current facility-administered medications for this visit.     ROS -  Card - no CP  GI - + occ mild nausea; no V/D/C    PE -  Gen : /81 (Site: Left Upper Arm,

## 2024-04-08 NOTE — PATIENT INSTRUCTIONS
cashews, 1 1/2 tablespoon of a oil-based dressing or 4 tablespoons of Italian dressing on a bed of salad greens, 1 1/2 tablespoons of peanut butter, 1 Cranberry Burden Kind Bar, 1 Caramel Burden Kind Bar, 2 Frigo mozzarella cheese sticks     Frozen meal options (<350 gretta):  Weight Watchers Smart Ones, Lean Cuisine, Healthy Choice, Nahed's, Zoë's, etc    Drink at least 64 oz of water each day     Take a multivitamin daily     Walk 30 min every day    ___________________________________    Start taking venlafaxine (Effexor) XR 37.5 mg, one tablet every day.  If no appetite suppression after one week, then increase to two tablets every day.  If no appetite suppression after one week on this dose, then stop taking it.    Check the BP twice each day for the first four days after starting it and again after increasing the dose. If the systolic BP is >155 mmHg or the diastolic BP is >90 mmHg consistently either time, then stop taking it.    Check a pregnancy test before starting the medication and every month thereafter.    Practice two forms of contraception while taking the weight loss drug (eg condoms + the rhythm method (no intercourse Day 8 after first day of spotting until Day 18))

## 2024-04-19 ENCOUNTER — TELEPHONE (OUTPATIENT)
Dept: BARIATRICS/WEIGHT MGMT | Age: 29
End: 2024-04-19

## 2024-11-22 ENCOUNTER — TELEPHONE (OUTPATIENT)
Dept: INFUSION THERAPY | Age: 29
End: 2024-11-22

## 2024-11-22 NOTE — TELEPHONE ENCOUNTER
Spoke to pt she said t hat she does not need the rhogam because baby is also a neg. I will be reaching out to [facility] office for a letter.

## 2024-12-27 LAB
BILIRUBIN, URINE: NEGATIVE
COLOR, UA: YELLOW
GLUCOSE URINE: NEGATIVE MG/DL
KETONES, URINE: NEGATIVE MG/DL
LEUKOCYTE ESTERASE, URINE: ABNORMAL
NITRITE, URINE: POSITIVE
PH, URINE: 6.5 (ref 5–9)
PROTEIN UA: NEGATIVE MG/DL
SPECIFIC GRAVITY UA: 1.02 (ref 1–1.03)
TURBIDITY: ABNORMAL
URINE HGB: NEGATIVE
UROBILINOGEN, URINE: 0.2 EU/DL (ref 0–1)

## 2024-12-29 LAB
CULTURE: ABNORMAL
SPECIMEN DESCRIPTION: ABNORMAL

## 2025-01-08 ENCOUNTER — HOSPITAL ENCOUNTER (OUTPATIENT)
Age: 30
Setting detail: OBSERVATION
Discharge: HOME | End: 2025-01-08
Attending: OBSTETRICS & GYNECOLOGY | Admitting: OBSTETRICS & GYNECOLOGY
Payer: MEDICAID

## 2025-01-08 VITALS — RESPIRATION RATE: 16 BRPM | HEART RATE: 111 BPM | DIASTOLIC BLOOD PRESSURE: 90 MMHG | SYSTOLIC BLOOD PRESSURE: 134 MMHG

## 2025-01-08 PROCEDURE — G0378 HOSPITAL OBSERVATION PER HR: HCPCS

## 2025-01-08 PROCEDURE — 99221 1ST HOSP IP/OBS SF/LOW 40: CPT | Performed by: ADVANCED PRACTICE MIDWIFE

## 2025-01-08 NOTE — H&P
CHIEF COMPLAINT:  sent in from office due to non reactive nst for prolonged monitoring    HISTORY OF PRESENT ILLNESS:      The patient is a 29 y.o. female at 36w5d.  OB History          4    Para   3    Term   3            AB        Living   3         SAB        IAB        Ectopic        Molar        Multiple   0    Live Births   3            Patient presents with a chief complaint as above and is being admitted for observation    Estimated Due Date: Estimated Date of Delivery: 25    PRENATAL CARE:    Complicated by: WPW syndrome, obesity    PAST OB HISTORY  OB History          4    Para   3    Term   3            AB        Living   3         SAB        IAB        Ectopic        Molar        Multiple   0    Live Births   3                Past Medical History:        Diagnosis Date    Anxiety     Depression     GERD (gastroesophageal reflux disease)     Obesity     Manuel-Parkinson-White syndrome     no surgery needed as infant     Past Surgical History:    History reviewed. No pertinent surgical history.  Allergies:  Patient has no known allergies.  Social History:    Social History     Socioeconomic History    Marital status:      Spouse name: Not on file    Number of children: Not on file    Years of education: Not on file    Highest education level: Not on file   Occupational History    Not on file   Tobacco Use    Smoking status: Former     Current packs/day: 0.00     Types: Cigarettes     Quit date: 2016     Years since quittin.7    Smokeless tobacco: Never   Vaping Use    Vaping status: Never Used   Substance and Sexual Activity    Alcohol use: No     Comment: socially    Drug use: No    Sexual activity: Yes     Partners: Male   Other Topics Concern    Not on file   Social History Narrative    Not on file     Social Determinants of Health     Financial Resource Strain: Not on file   Food Insecurity: Not on file   Transportation Needs: Not on file   Physical

## 2025-01-08 NOTE — PROGRESS NOTES
Discharge instructions provided and explained to patient. Patient verbalized understanding with no further questions. Patient left the unit ambulatory with no concerns.